# Patient Record
Sex: FEMALE | Race: WHITE | NOT HISPANIC OR LATINO | Employment: OTHER | URBAN - METROPOLITAN AREA
[De-identification: names, ages, dates, MRNs, and addresses within clinical notes are randomized per-mention and may not be internally consistent; named-entity substitution may affect disease eponyms.]

---

## 2017-01-21 ENCOUNTER — HOSPITAL ENCOUNTER (OUTPATIENT)
Dept: RADIOLOGY | Facility: HOSPITAL | Age: 67
Discharge: HOME | End: 2017-01-21
Payer: MEDICARE

## 2017-01-21 ENCOUNTER — GENERIC CONVERSION - ENCOUNTER (OUTPATIENT)
Dept: OTHER | Facility: OTHER | Age: 67
End: 2017-01-21

## 2017-01-21 ENCOUNTER — TRANSCRIBE ORDERS (OUTPATIENT)
Dept: RADIOLOGY | Facility: HOSPITAL | Age: 67
End: 2017-01-21

## 2017-01-21 ENCOUNTER — ALLSCRIPTS OFFICE VISIT (OUTPATIENT)
Dept: OTHER | Facility: OTHER | Age: 67
End: 2017-01-21

## 2017-01-21 DIAGNOSIS — J18.9 UNRESOLVED PNEUMONIA: Primary | ICD-10-CM

## 2017-01-21 DIAGNOSIS — M85.80 OTHER SPECIFIED DISORDERS OF BONE DENSITY AND STRUCTURE, UNSPECIFIED SITE: ICD-10-CM

## 2017-01-21 DIAGNOSIS — Z00.00 ENCOUNTER FOR GENERAL ADULT MEDICAL EXAMINATION WITHOUT ABNORMAL FINDINGS: ICD-10-CM

## 2017-01-21 DIAGNOSIS — R05.9 COUGH: ICD-10-CM

## 2017-01-21 DIAGNOSIS — J18.9 PNEUMONIA: ICD-10-CM

## 2017-01-21 PROCEDURE — 71020 HB CHEST X-RAY 2VW FRONTAL&LATL: CPT

## 2017-01-23 ENCOUNTER — GENERIC CONVERSION - ENCOUNTER (OUTPATIENT)
Dept: OTHER | Facility: OTHER | Age: 67
End: 2017-01-23

## 2017-02-17 ENCOUNTER — ALLSCRIPTS OFFICE VISIT (OUTPATIENT)
Dept: OTHER | Facility: OTHER | Age: 67
End: 2017-02-17

## 2017-02-17 ENCOUNTER — GENERIC CONVERSION - ENCOUNTER (OUTPATIENT)
Dept: OTHER | Facility: OTHER | Age: 67
End: 2017-02-17

## 2017-02-20 ENCOUNTER — GENERIC CONVERSION - ENCOUNTER (OUTPATIENT)
Dept: OTHER | Facility: OTHER | Age: 67
End: 2017-02-20

## 2017-02-20 ENCOUNTER — LAB CONVERSION - ENCOUNTER (OUTPATIENT)
Dept: OTHER | Facility: OTHER | Age: 67
End: 2017-02-20

## 2017-02-20 LAB
A/G RATIO (HISTORICAL): 1.9 (CALC) (ref 1–2.5)
ALBUMIN SERPL BCP-MCNC: 4.4 G/DL (ref 3.6–5.1)
ALP SERPL-CCNC: 46 U/L (ref 33–130)
ALT SERPL W P-5'-P-CCNC: 17 U/L (ref 6–29)
AST SERPL W P-5'-P-CCNC: 21 U/L (ref 10–35)
BILIRUB SERPL-MCNC: 0.6 MG/DL (ref 0.2–1.2)
BUN SERPL-MCNC: 13 MG/DL (ref 7–25)
BUN/CREA RATIO (HISTORICAL): NORMAL (CALC) (ref 6–22)
CALCIUM SERPL-MCNC: 9.2 MG/DL (ref 8.6–10.4)
CHLORIDE SERPL-SCNC: 107 MMOL/L (ref 98–110)
CHOLEST SERPL-MCNC: 261 MG/DL (ref 125–200)
CHOLEST/HDLC SERPL: 3.2 (CALC)
CO2 SERPL-SCNC: 23 MMOL/L (ref 20–31)
CREAT SERPL-MCNC: 0.85 MG/DL (ref 0.5–0.99)
EGFR AFRICAN AMERICAN (HISTORICAL): 83 ML/MIN/1.73M2
EGFR-AMERICAN CALC (HISTORICAL): 71 ML/MIN/1.73M2
GAMMA GLOBULIN (HISTORICAL): 2.3 G/DL (CALC) (ref 1.9–3.7)
GLUCOSE (HISTORICAL): 86 MG/DL (ref 65–99)
HDLC SERPL-MCNC: 81 MG/DL
HEPATITIS C ANTIBODY (HISTORICAL): NORMAL
LDL CHOLESTEROL (HISTORICAL): 166 MG/DL (CALC)
NON-HDL-CHOL (CHOL-HDL) (HISTORICAL): 180 MG/DL (CALC)
POTASSIUM SERPL-SCNC: 3.9 MMOL/L (ref 3.5–5.3)
SIGNAL TO CUT-OFF (HISTORICAL): 0.01
SODIUM SERPL-SCNC: 143 MMOL/L (ref 135–146)
TOTAL PROTEIN (HISTORICAL): 6.7 G/DL (ref 6.1–8.1)
TRIGL SERPL-MCNC: 69 MG/DL

## 2017-03-06 ENCOUNTER — HOSPITAL ENCOUNTER (OUTPATIENT)
Dept: RADIOLOGY | Age: 67
Discharge: HOME | End: 2017-03-06
Payer: MEDICARE

## 2017-03-06 DIAGNOSIS — M85.80 OTHER SPECIFIED DISORDERS OF BONE DENSITY AND STRUCTURE, UNSPECIFIED SITE: ICD-10-CM

## 2017-03-06 PROCEDURE — 77080 DXA BONE DENSITY AXIAL: CPT

## 2017-03-28 ENCOUNTER — ALLSCRIPTS OFFICE VISIT (OUTPATIENT)
Dept: OTHER | Facility: OTHER | Age: 67
End: 2017-03-28

## 2017-03-28 DIAGNOSIS — Z82.49 FAMILY HISTORY OF ISCHEMIC HEART DISEASE AND OTHER DISEASES OF THE CIRCULATORY SYSTEM: ICD-10-CM

## 2017-03-28 DIAGNOSIS — M85.80 OTHER SPECIFIED DISORDERS OF BONE DENSITY AND STRUCTURE, UNSPECIFIED SITE: ICD-10-CM

## 2017-03-31 ENCOUNTER — LAB CONVERSION - ENCOUNTER (OUTPATIENT)
Dept: OTHER | Facility: OTHER | Age: 67
End: 2017-03-31

## 2017-03-31 LAB — 25(OH)D3 SERPL-MCNC: 38 NG/ML (ref 30–100)

## 2017-04-04 ENCOUNTER — LAB CONVERSION - ENCOUNTER (OUTPATIENT)
Dept: OTHER | Facility: OTHER | Age: 67
End: 2017-04-04

## 2017-04-04 LAB
25(OH)D3 SERPL-MCNC: 38 NG/ML (ref 30–100)
APOLIPOPROTEIN B (HISTORICAL): 111 MG/DL (ref 49–103)
CHOLEST SERPL-MCNC: 261 MG/DL (ref 125–200)
CHOLEST/HDLC SERPL: 3.3 CALC
HDLC SERPL-MCNC: 80 MG/DL
HIGH SENSITIVITY C-REACTIVE PROTEIN (HISTORICAL): 0.9 MG/L
LDL CHOLESTEROL (HISTORICAL): 168 MG/DL
LDL SIZE (HISTORICAL): 228 ANGSTROM
LIPOPROTEIN (A) (HISTORICAL): 35 NMOL/L
Lab: 105 NMOL/MIN/ML (ref 50–133)
Lab: 122 NMOL/L (ref 115–386)
Lab: 1317 NMOL/L (ref 1016–2185)
Lab: 205 NMOL/L (ref 121–397)
Lab: 7540 NMOL/L (ref 5038–17886)
Lab: ABNORMAL PATTERN
NON-HDL-CHOL (CHOL-HDL) (HISTORICAL): 181 MG/DL
TRIGL SERPL-MCNC: 65 MG/DL

## 2017-04-18 ENCOUNTER — GENERIC CONVERSION - ENCOUNTER (OUTPATIENT)
Dept: OTHER | Facility: OTHER | Age: 67
End: 2017-04-18

## 2017-04-19 ENCOUNTER — HOSPITAL ENCOUNTER (OUTPATIENT)
Dept: CT IMAGING | Facility: HOSPITAL | Age: 67
Discharge: HOME | End: 2017-04-19
Attending: INTERNAL MEDICINE
Payer: MEDICARE

## 2017-04-19 ENCOUNTER — HOSPITAL ENCOUNTER (OUTPATIENT)
Dept: NON INVASIVE DIAGNOSTICS | Facility: CLINIC | Age: 67
Discharge: HOME | End: 2017-04-19
Payer: MEDICARE

## 2017-04-19 ENCOUNTER — TRANSCRIBE ORDERS (OUTPATIENT)
Dept: ADMINISTRATIVE | Facility: HOSPITAL | Age: 67
End: 2017-04-19

## 2017-04-19 DIAGNOSIS — Z82.49 FAMILY HISTORY OF ISCHEMIC HEART DISEASE AND OTHER DISEASES OF THE CIRCULATORY SYSTEM: ICD-10-CM

## 2017-04-19 LAB
CHEST PAIN STATEMENT: NORMAL
MAX DIASTOLIC BP: 60 MMHG
MAX HEART RATE: 131 BPM
MAX PREDICTED HEART RATE: 154 BPM
MAX. SYSTOLIC BP: 162 MMHG
PROTOCOL NAME: NORMAL
REASON FOR TERMINATION: NORMAL
TARGET HR FORMULA: NORMAL
TIME IN EXERCISE PHASE: 615 S

## 2017-04-19 PROCEDURE — 75571 CT HRT W/O DYE W/CA TEST: CPT

## 2017-04-19 PROCEDURE — 93306 TTE W/DOPPLER COMPLETE: CPT

## 2017-04-19 PROCEDURE — 93017 CV STRESS TEST TRACING ONLY: CPT

## 2017-04-20 ENCOUNTER — ALLSCRIPTS OFFICE VISIT (OUTPATIENT)
Dept: OTHER | Facility: OTHER | Age: 67
End: 2017-04-20

## 2017-04-20 ENCOUNTER — TRANSCRIBE ORDERS (OUTPATIENT)
Dept: ADMINISTRATIVE | Facility: HOSPITAL | Age: 67
End: 2017-04-20

## 2017-04-20 DIAGNOSIS — N64.4 MASTODYNIA: Primary | ICD-10-CM

## 2017-04-26 ENCOUNTER — HOSPITAL ENCOUNTER (OUTPATIENT)
Dept: MAMMOGRAPHY | Facility: CLINIC | Age: 67
Discharge: HOME | End: 2017-04-26
Payer: MEDICARE

## 2017-04-26 ENCOUNTER — HOSPITAL ENCOUNTER (OUTPATIENT)
Dept: ULTRASOUND IMAGING | Facility: CLINIC | Age: 67
Discharge: HOME | End: 2017-04-26
Payer: MEDICARE

## 2017-04-26 DIAGNOSIS — N64.4 MASTODYNIA: ICD-10-CM

## 2017-04-26 PROCEDURE — 76642 ULTRASOUND BREAST LIMITED: CPT

## 2017-04-26 PROCEDURE — G0204 DX MAMMO INCL CAD BI: HCPCS

## 2017-05-09 ENCOUNTER — ALLSCRIPTS OFFICE VISIT (OUTPATIENT)
Dept: OTHER | Facility: OTHER | Age: 67
End: 2017-05-09

## 2017-05-30 ENCOUNTER — GENERIC CONVERSION - ENCOUNTER (OUTPATIENT)
Dept: OTHER | Facility: OTHER | Age: 67
End: 2017-05-30

## 2017-10-06 ENCOUNTER — GENERIC CONVERSION - ENCOUNTER (OUTPATIENT)
Dept: OTHER | Facility: OTHER | Age: 67
End: 2017-10-06

## 2018-01-08 ENCOUNTER — HOSPITAL ENCOUNTER (OUTPATIENT)
Dept: RADIOLOGY | Facility: HOSPITAL | Age: 68
Discharge: HOME | End: 2018-01-08
Payer: MEDICARE

## 2018-01-08 ENCOUNTER — ALLSCRIPTS OFFICE VISIT (OUTPATIENT)
Dept: OTHER | Facility: OTHER | Age: 68
End: 2018-01-08

## 2018-01-08 ENCOUNTER — TRANSCRIBE ORDERS (OUTPATIENT)
Dept: ADMINISTRATIVE | Facility: HOSPITAL | Age: 68
End: 2018-01-08

## 2018-01-08 DIAGNOSIS — R05.9 COUGH: ICD-10-CM

## 2018-01-08 PROCEDURE — 71046 X-RAY EXAM CHEST 2 VIEWS: CPT

## 2018-01-09 NOTE — PROGRESS NOTES
Assessment   1  Persistent cough (786 2) (R05)    Plan   Persistent cough    · Promethazine-DM 6 25-15 MG/5ML Oral Syrup; TAKE 5 ML EVERY 4 TO 6 HOURS    AS NEEDED FOR COUGH   · * XR CHEST PA & LATERAL; Status:Active; Requested ANDREW:[id number]; Discussion/Summary      In summary then this is a 51-year-old female who appears to have had a case of influenza and now has a persistent cough  I auscultate no focal findings  She is in no respiratory distress and I see no evidence of cardiac disease, PE X cetera  We are going to give send her for a chest x-ray to rule out infiltrate  She is asked push fluids and use promethazine DM as needed  Discuss results of x-ray when they are available  She is asked to call in 2-3 days if her symptoms are not otherwise improving or she will call sooner or seek more urgent medical attention as needed  She agrees  Chief Complaint   I took the Tamiflu 10 days ago  I awoke at 2 am Sunday with a non stop cough  I began to vomit and did not get out of bed yesterday  I had a ST that is now better  NyQuil seems somewhat effective  I feel achy all over and I feel awful  No fever  Sputum is scant  I cant take a deep breath  No current CP but aching all over  I have been sick since [address]  History of Present Illness   HPI: The patient is a 51-year-old female states that she began with respiratory symptoms approximately 2 weeks ago  Her  was seen in the office diagnosed with influenza, started on Tamiflu and she was given a prescription for the same  She states that she took it beginning that day which was 12/26 which would have her finishing it on 12/30  She states that she canceled her plans for new year's Tara because she was not feeling well  She has continued to have a sore throat and low-grade fever with minimal cough  She states that she woke 2:00 a m  Sunday morning with a nonstop cough which ended with post cough emesis   She states that she did not get out of bed all day yesterday  She felt achy all over with nausea  She has had no documented fever  She has scant sputum  She states that she can't take a breath because it causes her to cough intractably but she has no chest pain and no true shortness of breath  She has had a mild headache  No lower GI or  symptoms  Cough, Chronic: Symptoms:  cough,-- non-productive cough,-- wheezing,-- nasal congestion,-- postnasal drainage,-- sore throat-- and-- fatigue, but-- no productive cough,-- no hemoptysis,-- no dyspnea,-- no chest pain-- and-- no fever  Review of Systems        Constitutional: chills-- and-- feeling tired, but-- no fever  Cardiovascular: No edema, PND orthopnea, but-- no lower extremity edema  Respiratory: cough, but-- as noted in HPI,-- no orthopnea-- and-- no PND  Integumentary: no rashes  Neurological: headache-- and-- Mild headache, but-- no dizziness  Active Problems   1  History of Abnormal ECG (794 31) (R94 31)   2  Breast lump (611 72) (N63 0)   3  Breast pain, right (611 71) (N64 4)   4  Breast tenderness (611 71) (N64 4)   5  History of Complete tear of right rotator cuff (727 61) (M75 121)   6  Encounter for gynecological examination with abnormal finding (V72 31) (Z01 411)   7  Hyperlipidemia (272 4) (E78 5)   8  Urge incontinence of urine (788 31) (N39 41)    Past Medical History   1  History of Abnormal ECG (794 31) (R94 31)   2  History of Arthritis (V13 4)   3  History of Atypical chest pain (786 59) (R07 89)   4  History of Complete tear of right rotator cuff (727 61) (M75 121)   5  History of  2 (V22 2) (Z33 1)   6  History of acute bronchitis (V12 69) (Z87 09)   7  History of acute sinusitis (V12 69) (Z87 09)   8  History of concussion (V15 52) (Z87 820)   9  History of osteoporosis (V13 59) (Z87 39)   10  History of Menopause (627 2) (Z78 0)   11  History of Nonvenomous Insect Bite Of Leg (916 4)   12  History of Palpitations (785 1) (R00 2)   13   History of Previously Pregnant With 2  Normal Vaginal Deliveries   14  Urge incontinence of urine (788 31) (N39 41)    Family History   Mother    1  Family history of Breast Disorders   2  Family history of    3  Family history of Diabetes Mellitus (V18 0)   4  Family history of asthma (V17 5) (Z82 5)   5  Family history of hip fracture (V17 89) (Z84 89)   6  Family history of Hiatal Hernia  Father    7  Family history of ASCVD (V17 49) (Z82 49)   8  Family history of hypertension (V17 49) (Z82 49)  Paternal Aunt    5  Family history of Breast Disorders   10  Family history of Depression With Anxiety    Social History    · Daily Coffee Consumption (2  Cups/Day)   · Denied: History of Drug Use   · Exercise: Walking   · Exercise: Yoga   · Exercising Regularly   · Has 2 children   · Never A Smoker   · No alcohol use   · Not currently sexually active   · Pets in the home    Surgical History   1  History of Arthroscopy Shoulder   2  History of Colonoscopy (Fiberoptic)   3  History of Dilation And Curettage   4  History of Knee Surgery    Current Meds    1  Betamethasone Dipropionate Aug 0 05 % External Cream; apply daily; Therapy: 26DOJ5481 to (Last Vickie Bumps)  Requested for: 2017 Ordered   2  Calcium + D TABS Recorded   3  Clobetasol Propionate 0 05 % External Ointment; APPLY AND GENTLY MASSAGE INTO     AFFECTED AREA(S) TWICE DAILY FOR TWO WEEKS AT A TIME  CALL     IF NO RESOLUTION OF ITCHING AFTER 2 WEEKS; Therapy: 2016 to (Last Vickie Bumps)  Requested for: 2017 Ordered   4  EQL Omega 3 Fish Oil CAPS Recorded   5  Ibandronate Sodium 150 MG Oral Tablet; take 1 tablet once monthly with 8 to 10 ounces     of water  stay upright and do not eat or drink for 1 hour  Requested for: 50CXC4833;     Last Rx:67Btf6507 Ordered   6  Multivitamins TABS Recorded   7  Oxybutynin Chloride ER 5 MG Oral Tablet Extended Release 24 Hour; Take 1 tablet     daily;      Therapy: 88NCT7325 to (Evaluate:25Kmm6656) Requested for: 59Mct9445; Last     Rx:[id number]; Status: ACTIVE - Renewal Denied, Transmit to Pharmacy - Awaiting     Verification Ordered   8  Vitamin D3 1000 UNIT Oral Tablet; TAKE AS DIRECTED; Therapy: (Recorded:28Mar2017) to Recorded    Allergies   1  Penicillins   2  Sulfa Drugs    Vitals    Recorded: 46FZA4503 11:55AM   Temperature 44 4 F   Systolic 613   Diastolic 58   Weight 832 lb    BMI Calculated 22 48   BSA Calculated 1 65     Physical Exam        Constitutional      General appearance: Abnormal  -- Late middle-aged female appearing fatigued but in no apparent distress  Eyes      Conjunctiva and lids: No swelling, erythema or discharge  Ears, Nose, Mouth, and Throat      Otoscopic examination: Tympanic membranes translucent with normal light reflex  Canals patent without erythema  Oropharynx: Normal with no erythema, edema, exudate or lesions  -- Mucosa is moist without lesion  Pulmonary      Respiratory effort: No increased work of breathing or signs of respiratory distress  Auscultation of lungs: Clear to auscultation  -- Lungs are presently clear to auscultation throughout  Cardiovascular      Auscultation of heart: Normal rate and rhythm, normal S1 and S2, without murmurs  -- Cardiac exam is regular without murmur gallop  Examination of extremities for edema and/or varicosities: Normal  -- No edema  Lymphatic      Palpation of lymph nodes in neck: No lymphadenopathy  -- No JVD  Musculoskeletal      Digits and nails: Normal without clubbing or cyanosis  -- Normal capillary refill without cyanosis or clubbing  Skin      Skin and subcutaneous tissue: Normal without rashes or lesions  -- Negative cord or calf tenderness  Psychiatric      Orientation to person, place, and time: Normal        Mood and affect: Normal           Future Appointments      Date/Time Provider Specialty Site   02/20/2018 08:30 AM GAYLE Hollingsworth   Family Medicine [address] [place] [address]   04/23/2018 11:20 AM GAYLE Abraham   Obstetrics/Gynecology [facility]     Signatures    Electronically signed by : GAYLE Noble ; Jan 8 2018 12:39PM EST                       (Author)

## 2018-01-09 NOTE — RESULT NOTES
Verified Results  * DXA BONE DENSITY SPINE HIP AND PELVIS [id number] 10:30AM Rosalind Godinez Order Number: [id number]    - Patient Instructions: To schedule this appointment, please contact Central Scheduling at [phone]  Test Name Result Flag Reference   DXA BONE DENSITY SPINE HIP AND PELVIS (Report)     DXA SCAN     CLINICAL HISTORY: 78-year-old woman  Menopause at age 54  OTHER RISK FACTORS: History of hip fracture in a parent  TECHNIQUE: Bone densitometry was performed using a Hologic Horizon A bone densitometer  Regions of interest appear properly placed  COMPARISON: February 19, 2015  RESULTS:      LUMBAR SPINE L1-L3: BMD 0 745 gm/cm2 / T-score -2 5 / Z score -0 7   (Lumbar levels within parentheses represent vertebrae excluded from analysis due to local structural abnormalities or artifact)  LEFT TOTAL HIP: BMD 0 772 gm/cm2 / T-score -1 4 / Z score -0 1   LEFT FEMORAL NECK: BMD 0 637 gm/cm2 / T score -1 9 / Z score -0 3     CHANGE: Since the last DXA:   LUMBAR SPINE BMD is unchanged  HIP BMD has decreased 0 012 gm/cm2 or 1 5%  This change is statistically significant  IMPRESSION:     1  Osteoporosis  2  Since a DXA study from February 19, 2015, bone mineral density has decreased 1 5% in the left hip  This change is statistically significant  3  The 10 year risk of hip fracture is 1 9% with the 10 year risk of major osteoporotic fracture being 17% as calculated by the [facility]/WHO fracture risk assessment tool (FRAX)  4  The current NOF guidelines recommend treating patients with a T-score of -2 5 or less in the lumbar spine or hips, or in post-menopausal women and men over the age of 48 with low bone mass (osteopenia) and a FRAX 10 year risk score of >3% for hip    fracture and/or >20% for major osteoporotic fracture       5  A daily intake of at least 1200 mg calcium and vitamin D 800- 1000 IU, as well as weight bearing and muscle strengthening exercise, fall prevention and avoidance of tobacco and excessive alcohol as preventive measurements are suggested  6  Follow-up DXA in two years is recommended for most patients  A one year follow-up DXA is recommended after initiation or change in therapy for osteoporosis  More frequent evaluation is also appropriate for patients with conditions associated with    rapid bone loss, such as glucocorticoid therapy  The FRAX tool has not been validated in patients currently or previously treated with pharmacotherapy for osteoporosis  In such patients, clinical judgment must be exercised in interpreting FRAX scores  It is not appropriate to use FRAX to monitor    treatment response  WHO CLASSIFICATION:   Normal (a T-score of -1 0 or higher)   Low bone mineral density (a T-score of less than -1 0 but higher than -2 5)   Osteoporosis (a T-score of -2 5 or less)   Severe osteoporosis (a T-score of -2 5 or less with a fragility fracture)     Least significant change (lumbar spine): 1 7%   Least significant change (total hip): 1 1%   Least significant change (forearm): 1 9%         Workstation performed: [id number]     Signed by:   Christiano Stanley MD   3/7/17

## 2018-01-11 NOTE — RESULT NOTES
Verified Results  (1) COMPREHENSIVE METABOLIC PANEL [id number] 73:84TO Linda Bolaños     Test Name Result Flag Reference   GLUCOSE 86 mg/dL  65-99   Fasting reference interval   UREA NITROGEN (BUN) 13 mg/dL  7-25   CREATININE 0 85 mg/dL  0 50-0 99   For patients >52years of age, the reference limit  for Creatinine is approximately 13% higher for people  identified as -American  eGFR NON-AFR  AMERICAN 71 mL/min/1 73m2  > OR = 60   eGFR AFRICAN AMERICAN 83 mL/min/1 73m2  > OR = 60   BUN/CREATININE RATIO   5-68   NOT APPLICABLE (calc)   SODIUM 143 mmol/L  135-146   POTASSIUM 3 9 mmol/L  3 5-5 3   CHLORIDE 107 mmol/L     CARBON DIOXIDE 23 mmol/L  20-31   CALCIUM 9 2 mg/dL  8 6-10 4   PROTEIN, TOTAL 6 7 g/dL  6 1-8 1   ALBUMIN 4 4 g/dL  3 6-5 1   GLOBULIN 2 3 g/dL (calc)  1 9-3 7   ALBUMIN/GLOBULIN RATIO 1 9 (calc)  1 0-2 5   BILIRUBIN, TOTAL 0 6 mg/dL  0 2-1 2   ALKALINE PHOSPHATASE 46 U/L     AST 21 U/L  10-35   ALT 17 U/L  6-29     (1) LIPID PANEL, FASTING [id number] 12:00AM Farhad Harvey     Test Name Result Flag Reference   CHOLESTEROL, TOTAL 261 mg/dL H 125-200   HDL CHOLESTEROL 81 mg/dL  > OR = 46   TRIGLICERIDES 69 mg/dL  <803   LDL-CHOLESTEROL 166 mg/dL (calc) H <130   Desirable range <100 mg/dL for patients with CHD or  diabetes and <70 mg/dL for diabetic patients with  known heart disease  CHOL/HDLC RATIO 3 2 (calc)  < OR = 5 0   NON HDL CHOLESTEROL 180 mg/dL (calc) H    Target for non-HDL cholesterol is 30 mg/dL higher than   LDL cholesterol target       (Q) CBC (H/H, RBC, INDICES, WBC, PLT) [id number] 12:00AM Farhad Harvey     Test Name Result Flag Reference   WHITE BLOOD CELL COUNT 5 3 Thousand/uL  3 8-10 8   RED BLOOD CELL COUNT 4 46 Million/uL  3 80-5 10   HEMOGLOBIN 12 9 g/dL  11 7-15 5   HEMATOCRIT 39 8 %  35 0-45 0   MCV 89 1 fL  80 0-100 0   MCH 28 9 pg  27 0-33 0   MCHC 32 4 g/dL  32 0-36 0   RDW 13 4 %  11 0-15 0   PLATELET COUNT 091 Thousand/uL  140-400   MPV 10 1 fL  7 5-12 5 WE RECEIVED YOUR HANDWRITTEN TEST ORDER AND  PERFORMED A HEMOGRAM WITH A PLATELET WITHOUT  A DIFFERENTIAL  IF THIS IS NOT WHAT YOU INTENDED  TO ORDER, PLEASE CONTACT YOUR LOCAL CLIENT SERVICE  REPRESENTATIVE IMMEDIATELY SO THAT WE CAN   ADJUST OUR BILLING APPROPRIATELY   YOU MAY ALSO   INQUIRE ABOUT ALTERNATIVE OR ADDITIONAL TESTING            (Q) HEPATITIS C ANTIBODY [id number] 12:00AM Stephen Silva     Test Name Result Flag Reference   HEPATITIS C ANTIBODY NON-REACTIVE  NON-REACTIVE   SIGNAL TO CUT-OFF 0 01  <1 00       Plan  PMH: Encounter for screening mammogram for malignant neoplasm of breast    · * MAMMO SCREENING BILATERAL W CAD; Status:Canceled;

## 2018-01-12 VITALS
SYSTOLIC BLOOD PRESSURE: 118 MMHG | BODY MASS INDEX: 21.84 KG/M2 | HEIGHT: 65 IN | DIASTOLIC BLOOD PRESSURE: 68 MMHG | HEART RATE: 60 BPM | WEIGHT: 131.06 LBS

## 2018-01-12 VITALS — DIASTOLIC BLOOD PRESSURE: 80 MMHG | TEMPERATURE: 100.4 F | SYSTOLIC BLOOD PRESSURE: 142 MMHG

## 2018-01-12 NOTE — MISCELLANEOUS
Message   Recorded as Task   Date: 06/26/2017 08:25 AM, Created By: Betsy Farfan   Task Name: Med Renewal Request   Assigned To: Marnie Richardson   Regarding Patient: Hubert Carter, Status: In Progress   Comment:    Selina Navas - 26 Jun 2017 8:25 AM     TASK CREATED  Caller: Self; Renew Medication; [phone] (Mobile Phone)  pt is requesting 2 rx's ( she thought they were refilled at yearly) for Clobetasol Propionate 0 05% ointment and Dioprolene AF cream 0 05% cream  CVs on file  pt is @ [phone]  Selina Navas - 26 Jun 2017 8:58 AM     TASK REASSIGNED: Previously Assigned To Eugenia Lopez - 26 Jun 2017 9:28 AM     TASK IN PROGRESS   Orin Cash - 26 Jun 2017 9:31 AM     TASK EDITED  Pt said pcp ordered diprolene for her - she uses clobetesol and diprolene for vag/adrian itch  She said she discussed both at last visit ( sx come and go) and you said you would refill both for her  May I refill them ? ? Thank you   Leslee Villareal - 26 Jun 2017 5:58 PM     TASK REPLIED TO: Previously Assigned To Kristen Santizo  Yes, please  Daria Delatorre - 27 Jun 2017 7:38 AM     TASK EDITED  spoke with pt    rx to ehr        Active Problems    1  History of Abnormal ECG (794 31) (R94 31)   2  Breast lump (611 72) (N63)   3  Breast lump (611 72) (N63)   4  Breast pain, right (611 71) (N64 4)   5  Breast tenderness (611 71) (N64 4)   6  History of Complete tear of right rotator cuff (727 61) (M75 121)   7  Encounter for gynecological examination with abnormal finding (V72 31) (Z01 411)   8  Hyperlipidemia (272 4) (E78 5)   9  Urge incontinence of urine (788 31) (N39 41)    Current Meds   1  Calcium + D TABS Recorded   2  EQL Omega 3 Fish Oil CAPS Recorded   3  Ibandronate Sodium 150 MG Oral Tablet; take 1 tablet once monthly with 8 to 10 ounces   of water  stay upright and do not eat or drink for 1 hour  Requested for: 50JNV4965;   Last Rx:[id number] Ordered   4  Multivitamins TABS Recorded   5  Oxybutynin Chloride ER 5 MG Oral Tablet Extended Release 24 Hour; Take 1 tablet   daily; Therapy: 75OHF6059 to (Evaluate:86Lsv5887)  Requested for: 94BUA6590; Last   Rx:[id number] Ordered   6  Vitamin D3 1000 UNIT Oral Tablet; TAKE AS DIRECTED; Therapy: (Recorded:28Mar2017) to Recorded    Allergies    1  Penicillins   2  Sulfa Drugs    Plan  PMH: Menopause, SocHx: No alcohol use    · Betamethasone Dipropionate Aug 0 05 % External Cream; apply daily  PMH: Vulvar itching    · Clobetasol Propionate 0 05 % External Ointment; APPLY AND GENTLY  MASSAGE INTO AFFECTED AREA(S) TWICE DAILY FOR TWO WEEKS AT A TIME     CALL IF NO RESOLUTION OF ITCHING AFTER 2 WEEKS    Signatures   Electronically signed by : Cira Mcfadden, ; Jun 27 2017  7:39AM EST                       (Author)

## 2018-01-12 NOTE — MISCELLANEOUS
Message  Message Free Text Note Form: stat cxr normal: pt aware        Signatures   Electronically signed by : Melania Ga; Jan 21 2017  7:57PM EST                       (Author)

## 2018-01-13 VITALS
HEIGHT: 65 IN | BODY MASS INDEX: 21.33 KG/M2 | HEART RATE: 68 BPM | WEIGHT: 128 LBS | SYSTOLIC BLOOD PRESSURE: 110 MMHG | DIASTOLIC BLOOD PRESSURE: 70 MMHG

## 2018-01-14 VITALS
SYSTOLIC BLOOD PRESSURE: 120 MMHG | WEIGHT: 130 LBS | BODY MASS INDEX: 21.66 KG/M2 | DIASTOLIC BLOOD PRESSURE: 60 MMHG | HEIGHT: 65 IN

## 2018-01-15 NOTE — RESULT NOTES
Verified Results  * XR CHEST PA & LATERAL [id number] 12:36PM Kathleen Brody     Test Name Result Flag Reference   XR CHEST PA & LATERAL (Report)     CHEST - DUAL ENERGY     INDICATION: Cough for 3 days  Possible right-sided pneumonia  COMPARISON: Chest radiographs February 13, 2013     VIEWS: PA (including soft tissue/bone algorithms) and lateral projections; 4 images     FINDINGS:        Cardiomediastinal silhouette appears unremarkable  Atherosclerotic changes in the aorta  The lungs are hyperinflated and clear  No pneumothorax or pleural effusion  Visualized osseous structures appear within normal limits for the patient's age  IMPRESSION:   Hyperinflation  No active pulmonary disease         Workstation performed: [id number]     Signed by:   Brigette Goodwin DO   1/21/17

## 2018-01-15 NOTE — RESULT NOTES
Verified Results  * MAMMO SCREENING BILATERAL W CAD [id number] 12:00AM Carlos Santizo     Test Name Result Flag Reference   MAMMO SCREENING BILATERAL W CAD 06/01/2015

## 2018-01-17 NOTE — RESULT NOTES
Verified Results  * MRI SHOULDER RIGHT WO CONTRAST [id number] 09:48AM Rebecca Donovan Order Number: [id number]   Performing Comments: MRI RIGHT SHOULDER RTC TEAR BICEP TEAR   - Patient Instructions: To schedule this appointment, please contact Central Scheduling at [phone]  Test Name Result Flag Reference   MRI SHOULDER RIGHT WO CONTRAST (Report)     MRI RIGHT SHOULDER     INDICATION: Right shoulder pain after lifting injury  Decreased range of motion  COMPARISON: Plain film dated 2/26/2016     TECHNIQUE:  The following MR sequences were obtained of the right shoulder: Localizer, axial GRE/PD fat sat, oblique coronal T2 fat sat, oblique sagittal T1/T2 fat sat  Images were acquired on a 1 5 Char unit  Gadolinium was not used  FINDINGS:     SUBCUTANEOUS TISSUES: Normal     JOINT EFFUSION: Moderate effusion  ACROMION PROCESS: Lateral downsloping of the acromion process with small subacromial spur  ROTATOR CUFF: Supraspinatus and infraspinatus insertional tendinosis with a full-thickness anterior leading edge supraspinatus tendon tear measuring up to 7 mm in transverse dimension without significant tendon retraction  Remaining muscles and tendons    of the rotator cuff appear intact without fatty muscular infiltration  SUBACROMIAL/SUBDELTOID BURSA: Minimal bursal fluid  LONG HEAD OF BICEPS TENDON: Proximal biceps tendinosis without tear  GLENOID LABRUM: Intact  GLENOHUMERAL JOINT: Intact  ACROMIOCLAVICULAR JOINT: Degenerative change noted with reactive edema  BONE MARROW SIGNAL: Normal        IMPRESSION:   1  Subacromial spur with subscapularis, supraspinatus and infraspinatus insertional tendinosis including a small full-thickness anterior leading edge supraspinatus tendon tear as above  2  Proximal biceps tendinosis without tear  3  Intact glenoid labrum  4  Joint effusion         Workstation performed: [id number]     Signed by:   Cyndie Crowley MD 3/3/16                             (1) LIPID PANEL, FASTING [id number] 12:00AM JamielaurelFarhad     Test Name Result Flag Reference   CHOLESTEROL, TOTAL 246 mg/dL H 125-200   HDL CHOLESTEROL 82 mg/dL  > OR = 46   TRIGLICERIDES 71 mg/dL  <383   LDL-CHOLESTEROL 150 mg/dL (calc) H <130   Desirable range <100 mg/dL for patients with CHD or  diabetes and <70 mg/dL for diabetic patients with  known heart disease  CHOL/HDLC RATIO 3 0 (calc)  < OR = 5 0   NON HDL CHOLESTEROL 164 mg/dL (calc) H    Target for non-HDL cholesterol is 30 mg/dL higher than   LDL cholesterol target  (1) COMPREHENSIVE METABOLIC PANEL [id number] 35:52WG [facility]     Test Name Result Flag Reference   GLUCOSE 93 mg/dL  65-99   Fasting reference interval   UREA NITROGEN (BUN) 17 mg/dL  7-25   CREATININE 0 79 mg/dL  0 50-0 99   For patients >52years of age, the reference limit  for Creatinine is approximately 13% higher for people  identified as -American  eGFR NON-AFR   AMERICAN 79 mL/min/1 73m2  > OR = 60   eGFR AFRICAN AMERICAN 91 mL/min/1 73m2  > OR = 60   BUN/CREATININE RATIO   1-32   NOT APPLICABLE (calc)   SODIUM 141 mmol/L  135-146   POTASSIUM 4 3 mmol/L  3 5-5 3   CHLORIDE 105 mmol/L     CARBON DIOXIDE 26 mmol/L  19-30   CALCIUM 10 2 mg/dL  8 6-10 4   PROTEIN, TOTAL 6 9 g/dL  6 1-8 1   ALBUMIN 4 6 g/dL  3 6-5 1   GLOBULIN 2 3 g/dL (calc)  1 9-3 7   ALBUMIN/GLOBULIN RATIO 2 0 (calc)  1 0-2 5   BILIRUBIN, TOTAL 0 4 mg/dL  0 2-1 2   ALKALINE PHOSPHATASE 43 U/L     AST 17 U/L  10-35   ALT 18 U/L  6-29     (1) T4, FREE [id number] 12:00AM Wes Farhad     Test Name Result Flag Reference   T4, FREE 1 1 ng/dL  0 8-1 8     (Q) CBC (H/H, RBC, INDICES, WBC, PLT) [id number] 12:00AM Farhad Harvey     Test Name Result Flag Reference   WHITE BLOOD CELL COUNT 6 6 Thousand/uL  3 8-10 8   RED BLOOD CELL COUNT 4 67 Million/uL  3 80-5 10   HEMOGLOBIN 13 4 g/dL  11 7-15 5   HEMATOCRIT 41 5 %  35 0-45 0   MCV 88 8 fL  80 0-100 0   MCH 28 8 pg  27 0-33 0   MCHC 32 4 g/dL  32 0-36 0   RDW 13 8 %  11 0-15 0   PLATELET COUNT 414 Thousand/uL  140-400   MPV 10 5 fL  7 5-11 5   WE RECEIVED YOUR HANDWRITTEN TEST ORDER AND  PERFORMED A HEMOGRAM WITH A PLATELET WITHOUT  A DIFFERENTIAL  IF THIS IS NOT WHAT YOU INTENDED  TO ORDER, PLEASE CONTACT YOUR LOCAL CLIENT SERVICE  REPRESENTATIVE IMMEDIATELY SO THAT WE CAN   ADJUST OUR BILLING APPROPRIATELY   YOU MAY ALSO   INQUIRE ABOUT ALTERNATIVE OR ADDITIONAL TESTING            (Q) TSH, 3RD GENERATION [id number] 12:00AM Stephen Silva     Test Name Result Flag Reference   TSH 1 17 mIU/L  0 40-4 50

## 2018-01-22 VITALS
TEMPERATURE: 98.9 F | HEIGHT: 65 IN | WEIGHT: 128 LBS | DIASTOLIC BLOOD PRESSURE: 50 MMHG | BODY MASS INDEX: 21.33 KG/M2 | SYSTOLIC BLOOD PRESSURE: 100 MMHG

## 2018-01-23 VITALS
SYSTOLIC BLOOD PRESSURE: 118 MMHG | TEMPERATURE: 98.8 F | WEIGHT: 133 LBS | DIASTOLIC BLOOD PRESSURE: 58 MMHG | BODY MASS INDEX: 22.48 KG/M2

## 2018-01-23 NOTE — RESULT NOTES
Verified Results  * XR CHEST PA & LATERAL [id number] 12:53PM Ana Rosa Montes Order Number: [id number]     Test Name Result Flag Reference   XR CHEST PA & LATERAL (Report)     CHEST      INDICATION: Cough     COMPARISON: 1/21/2017     VIEWS: Frontal and lateral projections     IMAGES: 2     FINDINGS:        Cardiomediastinal silhouette appears unremarkable  The lungs are clear  No pneumothorax or pleural effusion  Visualized osseous structures appear within normal limits for the patient's age  IMPRESSION:     No active pulmonary disease         Workstation performed: [id number]     Signed by:   Ginny Tang MD   1/8/18       Plan  PMH: Tick bite of back, initial encounter    · Doxycycline Hyclate 100 MG Oral Capsule; TAKE TWO STAT THEN AS  DIRECTED

## 2018-01-30 ENCOUNTER — ANESTHESIA EVENT (OUTPATIENT)
Dept: GASTROENTEROLOGY | Facility: HOSPITAL | Age: 68
End: 2018-01-30
Payer: MEDICARE

## 2018-01-30 NOTE — ANESTHESIA PREPROCEDURE EVALUATION
Review of Systems/Medical History  Patient summary reviewed        Cardiovascular  Negative cardio ROS    Pulmonary  Negative pulmonary ROS        GI/Hepatic    GERD ,        Negative  ROS        Endo/Other  Negative endo/other ROS      GYN  Negative gynecology ROS          Hematology  Negative hematology ROS      Musculoskeletal  Negative musculoskeletal ROS        Neurology  Negative neurology ROS      Psychology   Negative psychology ROS              Physical Exam    Airway    Mallampati score: II  TM Distance: >3 FB  Neck ROM: full     Dental       Cardiovascular  Comment: Negative ROS, Cardiovascular exam normal    Pulmonary  Pulmonary exam normal     Other Findings        Anesthesia Plan  ASA Score- 2     Anesthesia Type- IV sedation with anesthesia with ASA Monitors  Additional Monitors:   Airway Plan:         Plan Factors-    Induction- intravenous  Postoperative Plan-     Informed Consent- Anesthetic plan and risks discussed with patient  I personally reviewed this patient with the CRNA  Discussed and agreed on the Anesthesia Plan with the CRNA  Love Valdes

## 2018-01-31 ENCOUNTER — ANESTHESIA (OUTPATIENT)
Dept: GASTROENTEROLOGY | Facility: HOSPITAL | Age: 68
End: 2018-01-31
Payer: MEDICARE

## 2018-01-31 ENCOUNTER — HOSPITAL ENCOUNTER (OUTPATIENT)
Facility: HOSPITAL | Age: 68
Setting detail: OUTPATIENT SURGERY
Discharge: HOME | End: 2018-01-31
Attending: INTERNAL MEDICINE | Admitting: INTERNAL MEDICINE
Payer: MEDICARE

## 2018-01-31 VITALS
HEART RATE: 69 BPM | HEIGHT: 64 IN | RESPIRATION RATE: 16 BRPM | BODY MASS INDEX: 22.2 KG/M2 | OXYGEN SATURATION: 97 % | DIASTOLIC BLOOD PRESSURE: 62 MMHG | WEIGHT: 130 LBS | TEMPERATURE: 99.1 F | SYSTOLIC BLOOD PRESSURE: 105 MMHG

## 2018-01-31 RX ORDER — TOBRAMYCIN 3 MG/ML
2 SOLUTION/ DROPS OPHTHALMIC
Status: DISCONTINUED | OUTPATIENT
Start: 2018-01-31 | End: 2018-01-31 | Stop reason: HOSPADM

## 2018-01-31 RX ORDER — PROPOFOL 10 MG/ML
INJECTION, EMULSION INTRAVENOUS AS NEEDED
Status: DISCONTINUED | OUTPATIENT
Start: 2018-01-31 | End: 2018-01-31 | Stop reason: SURG

## 2018-01-31 RX ORDER — SODIUM CHLORIDE 9 MG/ML
125 INJECTION, SOLUTION INTRAVENOUS CONTINUOUS
Status: DISCONTINUED | OUTPATIENT
Start: 2018-01-31 | End: 2018-01-31 | Stop reason: HOSPADM

## 2018-01-31 RX ORDER — PROPOFOL 10 MG/ML
INJECTION, EMULSION INTRAVENOUS CONTINUOUS PRN
Status: DISCONTINUED | OUTPATIENT
Start: 2018-01-31 | End: 2018-01-31 | Stop reason: SURG

## 2018-01-31 RX ORDER — OXYBUTYNIN CHLORIDE 5 MG/1
5 TABLET, EXTENDED RELEASE ORAL DAILY
COMMUNITY
End: 2018-02-20 | Stop reason: SDUPTHER

## 2018-01-31 RX ADMIN — PROPOFOL 100 MG: 10 INJECTION, EMULSION INTRAVENOUS at 10:25

## 2018-01-31 RX ADMIN — SODIUM CHLORIDE: 0.9 INJECTION, SOLUTION INTRAVENOUS at 09:38

## 2018-01-31 RX ADMIN — PROPOFOL 100 MCG/KG/MIN: 10 INJECTION, EMULSION INTRAVENOUS at 10:25

## 2018-01-31 NOTE — ANESTHESIA POSTPROCEDURE EVALUATION
Post-Op Assessment Note      CV Status:  Stable    Mental Status:  Somnolent    Hydration Status:  Euvolemic    PONV Controlled:  Controlled    Airway Patency:  Patent    Post Op Vitals Reviewed: Yes          Staff: CRNA           /59 (01/31/18 1050)    Temp      Pulse 80 (01/31/18 1050)   Resp 16 (01/31/18 1050)    SpO2 99 % (01/31/18 1050)

## 2018-01-31 NOTE — H&P
H&P EXAM - Outpatient Endoscopy   Jesus Gillespie 79 y o  female MRN: [id number]    [facility] GI LAB PRE/POST   Encounter: [id number]        Impression:  Average risk colonoscopy screening    Plan:  Colonoscopy    Chief Complaint:  Screening    Physical Exam:  Alert oriented in no distress   Chest:  Clear   Heart:  Regular    No new problems prep went    Well

## 2018-01-31 NOTE — OP NOTE
**** GI/ENDOSCOPY REPORT ****     PATIENT NAME: Parris Ni ------ VISIT ID:  Patient ID: [id number]   YOB: 1950     INTRODUCTION: Colonoscopy - A 79 female patient presents for an outpatient   Colonoscopy at [address] [place] [address]  PREVIOUS COLONOSCOPY: Patient's last colonoscopy was 10 years ago  INDICATIONS: Surveillance  Average-risk screening for colon cancer and   colonic polyps  CONSENT:  The benefits, risks, and alternatives to the procedure were   discussed and informed consent was obtained from the patient  PREPARATION: EKG, pulse, pulse oximetry and blood pressure were monitored   throughout the procedure  The patient was identified by myself both   verbally and by visual inspection of ID band  Airway Assessment   Classification: Airway class 2 - Visualization of the soft palate, fauces   and uvula  ASA Classification: Class 2 - Patient has mild to moderate   systemic disturbance that may or may not be related to the disorder   requiring surgery  MEDICATIONS: Anesthesia-check records     PROCEDURE:  The endoscope was passed without difficulty through the anus   under direct visualization and advanced to the cecum, confirmed by   appendiceal orifice and ileocecal valve  The scope was withdrawn and the   mucosa was carefully examined  The quality of the preparation was  Cecal   Intubation Time: Minute(s) Scope Withdrawal Time: Minute(s)     RECTAL EXAM: Normal rectal exam  Normal sphincter tone  FINDINGS:  The colon appeared to be normal      COMPLICATIONS: There were no complications  IMPRESSIONS: Normal colon  RECOMMENDATIONS: Colonoscopy recommended in 10 years  patient will be sent   a reminder letter       ESTIMATED BLOOD LOSS:     PATHOLOGY SPECIMENS:     PROCEDURE CODES:     ICD-9 Codes: V76 51 Special screening for malignant neoplasms of colon     ICD-10 Codes: Z12 11 Encounter for screening for malignant neoplasm of   colon PERFORMED BY: GAYLE Hunt Aas  on 01/31/2018  Version 1, electronically signed by GAYLE Villasenor , D O  on   01/31/2018 at 10:48

## 2018-01-31 NOTE — ANESTHESIA POSTPROCEDURE EVALUATION
Post-Op Assessment Note          Comments: Pt c/o "stinging in the left eye" no FB senseation  No tearing  Sclera is red  Explained that I will give her tobramycin drops  After calling in a STAt order to pharmacy  Pt now states the eye is no longer stinging and is getting better  Will give patient eye drop if she wishes to use them  All agree with this plan            /62 (01/31/18 1113)    Temp      Pulse 69 (01/31/18 1113)   Resp 16 (01/31/18 1113)    SpO2 97 % (01/31/18 1113)

## 2018-02-20 ENCOUNTER — OFFICE VISIT (OUTPATIENT)
Dept: FAMILY MEDICINE CLINIC | Facility: CLINIC | Age: 68
End: 2018-02-20
Payer: MEDICARE

## 2018-02-20 VITALS
SYSTOLIC BLOOD PRESSURE: 110 MMHG | WEIGHT: 131 LBS | DIASTOLIC BLOOD PRESSURE: 60 MMHG | HEIGHT: 65 IN | BODY MASS INDEX: 21.83 KG/M2

## 2018-02-20 DIAGNOSIS — Z00.00 HEALTH MAINTENANCE EXAMINATION: Primary | ICD-10-CM

## 2018-02-20 DIAGNOSIS — R92.2 DENSE BREAST TISSUE ON MAMMOGRAM: ICD-10-CM

## 2018-02-20 DIAGNOSIS — J98.01 BRONCHOSPASM: ICD-10-CM

## 2018-02-20 DIAGNOSIS — E78.5 HYPERLIPIDEMIA, UNSPECIFIED HYPERLIPIDEMIA TYPE: ICD-10-CM

## 2018-02-20 DIAGNOSIS — Z12.39 SCREENING BREAST EXAMINATION: ICD-10-CM

## 2018-02-20 DIAGNOSIS — N39.41 URGE INCONTINENCE OF URINE: ICD-10-CM

## 2018-02-20 PROBLEM — N63.0 BREAST LUMP: Status: ACTIVE | Noted: 2017-04-20

## 2018-02-20 PROCEDURE — 36415 COLL VENOUS BLD VENIPUNCTURE: CPT | Performed by: FAMILY MEDICINE

## 2018-02-20 PROCEDURE — G0439 PPPS, SUBSEQ VISIT: HCPCS | Performed by: FAMILY MEDICINE

## 2018-02-20 RX ORDER — METHYLPREDNISOLONE 4 MG/1
TABLET ORAL
Qty: 21 TABLET | Refills: 0 | Status: SHIPPED | OUTPATIENT
Start: 2018-02-20 | End: 2018-04-24 | Stop reason: ALTCHOICE

## 2018-02-20 RX ORDER — OXYBUTYNIN CHLORIDE 5 MG/1
5 TABLET, EXTENDED RELEASE ORAL DAILY
Qty: 30 TABLET | Refills: 5 | Status: SHIPPED | OUTPATIENT
Start: 2018-02-20 | End: 2018-02-20 | Stop reason: SDUPTHER

## 2018-02-20 RX ORDER — OXYBUTYNIN CHLORIDE 5 MG/1
5 TABLET, EXTENDED RELEASE ORAL DAILY
Qty: 90 TABLET | Refills: 1 | Status: SHIPPED | OUTPATIENT
Start: 2018-02-20

## 2018-02-20 NOTE — PROGRESS NOTES
HPI:  Matthew Stokes is a 79 y o  female here for her Subsequent Wellness Visit  The patient is a 80-year-old female here for a Medicare subsequent wellness visit  All components of this examination were completed today  We did discuss some other issues  She has been on Boniva for least 5 years  We are going to have her discontinue this prescription after her current supply is exhausted in the next 2-3 months  She did have mammographic abnormality noted last year  It was suggested that she return to 1 year follow-up after her findings were felt to be benign  It was noted that she had dense breasts summer going to recommend that she get 3D mammography this year  She is given a requisition for same  She has of persistent cough that she complains of today which is bronchospastic in nature  Her  notes that she wheezes at night  She has had no significant sputum, chest pain or shortness of breath  We reviewed her vaccinations which are current  She did have a Zostavax at [facility] within the last couple years and will need to follow up on the date of this  Her current cell phone number is [phone]  Patient Active Problem List   Diagnosis    Complete tear of right rotator cuff    Hyperlipidemia    Breast lump    Urge incontinence of urine    Bronchospasm    Dense breast tissue on mammogram    Screening breast examination     Past Medical History:   Diagnosis Date    Acid reflux     with certain foods    Blepharitis of right upper eyelid     sty lanced 4/10/16    Osteopenia      Past Surgical History:   Procedure Laterality Date    ARTHROSCOPIC REPAIR ACL Left     COLONOSCOPY      DILATION AND CURETTAGE, DIAGNOSTIC / THERAPEUTIC      VT COLONOSCOPY FLX DX W/COLLJ SPEC WHEN PFRMD N/A 1/31/2018    Procedure: COLONOSCOPY;  Surgeon: Myriam Hernandez MD;  Location: [facility];   Service: Gastroenterology    VT [address] ARTHROSCOP,SURG,W/ROTAT CUFF REPR Right 5/5/2016    Procedure: REPAIR ROTATOR CUFF  ARTHROSCOPIC;  Surgeon: Johnny Mccallum MD;  Location: [facility] MAIN OR;  Service: Orthopedics    SHOULDER ARTHROSCOPY Right 5/5/2016    Procedure: ARTHROSCOPY SHOULDER-subacromial decompression, Biceps tenodesis; Surgeon: Johnny Mccallum MD;  Location: [facility] MAIN OR;  Service:     TONSILLECTOMY      age 10    WISDOM TOOTH EXTRACTION      x 4     Family History   Problem Relation Age of Onset    Cancer Mother      breast    Asthma Mother     Diverticulitis Mother     Heart disease Father      pacemaker, MI    Arthritis Father     Cancer Paternal Aunt      breast    Heart disease Paternal Aunt     Stroke Paternal Aunt     Heart disease Paternal Uncle     Heart disease Paternal Grandmother      History   Smoking Status    Never Smoker   Smokeless Tobacco    Never Used     History   Alcohol Use    Yes     Comment: occ      History   Drug Use No     /60 (BP Location: Right arm, Patient Position: Sitting, Cuff Size: Standard)   Ht 5' 4 5" (1 638 m)   Wt 59 4 kg (131 lb)   LMP  (LMP Unknown) Comment: POST MENOPAUSAL  BMI 22 14 kg/m²       Current Outpatient Prescriptions   Medication Sig Dispense Refill    Calcium Carbonate-Vitamin D (CALTRATE 600+D PO) Take by mouth every morning   ibandronate (BONIVA) 150 MG tablet Take 150 mg by mouth every 30 (thirty) days  Take in AM with glass of water prior to food, don't lie down for 30 minutes   Omega 3-6-9 Fatty Acids (OMEGA 3-6-9 PO) Take by mouth 2 (two) times a day   oxybutynin (DITROPAN-XL) 5 mg 24 hr tablet Take 1 tablet (5 mg total) by mouth daily 90 tablet 1    Vitamin D, Cholecalciferol, 1000 UNITS CAPS Take by mouth every morning   ibuprofen (MOTRIN) 600 mg tablet Take 1 tablet by mouth every 6 (six) hours as needed for mild pain for up to 10 days 40 tablet 0    Methylprednisolone 4 MG TBPK Use as directed on package 21 tablet 0     No current facility-administered medications for this visit  Allergies   Allergen Reactions    Penicillins Anaphylaxis     Hives, throat swelling    Sulfa Antibiotics Anaphylaxis     Hives, throat swelling     Immunization History   Administered Date(s) Administered    Influenza 02/10/2014    Influenza Split High Dose Preservative Free IM 10/08/2015    Influenza TIV (IM) 09/17/2016    Pneumococcal Conjugate 13-Valent 02/16/2016       Patient Care Team:  Roney Booth MD as PCP - MD Roney Peoples MD    Medicare Screening Tests and Risk Assessments:  AW Clinical     ISAR:   Previous hospitalizations?:  No       Once in a Lifetime Medicare Screening:       Medicare Screening Tests and Risk Assessment:   AAA Risk Assessment    Osteoporosis Risk Assessment     Female:  Yes   :  Yes    Age over 48:  Yes    Tobacco use:  No Alcohol use:  Yes   HIV Risk Assessment        Drug and Alcohol Use:   Tobacco use    Cigarettes:  never smoker    Smokeless:  never used smokeless tobacco    Tobacco use duration    Tobacco Cessation Readiness    Alcohol use    Alcohol use:  occasional use    Amount of alcohol consumed:  1 glass of wine occasionallly   Concern about alcohol use:  No    Alcohol Treatment Readiness   Illicit Drug Use    Drug use:  never        Diet & Exercise:   Diet   What is your diet?:  Regular, Low Cholesterol, Low Carb   How many servings a day of the following:   Fruits and Vegetables:  1-2 Meat:  1-2   Whole Grains:  2 Simple Carbs:  2   Dairy:  2 Soda:  0   Coffee:  2 Tea:  1   Exercise    Do you currently exercise?:  yes    Frequency:  daily    Minutes per day:  60    Type of exercise:  walking   stretching        Cognitive Impairment Screening:   Depression screening preformed:  Yes    Cognitive Impairment Screening    Do you have difficulty learning or retaining new information?:  No Do you have difficulty handling new tasks?:  No   Do you have difficulty with reasoning?:  No Do you have difficulty with spatial ability and orientation?:  No   Do you have difficulty with language?:  No Do you have difficulty with behavior?:  No       Functional Ability/Level of Safety:   Hearing     Bilateral:  slightly decreased   Hearing aid:  No    Hearing Impairment Assessment    Current Activities    Status:  unlimited ADL's, unlimited social activities   Help needed with the folllowing:    Using the phone:  No Transportation:  No   Shopping:  No Preparing Meals:  No   Doing Housework:  No Doing Laundry:  No   Managing Medications:  No Managing Money:  No   ADL    Feeding:  Independant   Oral hygiene and Facial grooming:  Independant   Bathing:  Independant   Upper Body Dressing:  Independant   Lower Body Dressing:  Independant   Toileting:  Independant   Bed Mobility:  Independant   Fall Risk   Have you fallen in the last 12 months?:  No    Injury History       Home Safety:   Home Safety Risk Factors   Unfamilar with surroundings:  No Uneven floors:  No   Stairs or handrail saftey risk:  Yes Loose rugs:  Yes   Household clutter:  No Poor household lighting:  No   No grab bars in bathroom:  No Further evaluation needed:  No       Advanced Directives:   Advanced Directives    Living Will:  Yes Durable POA for healthcare:   Yes   Advanced directive:  Yes    Patient's End of Life Decisions        Urinary Incontinence:       Glaucoma:            Provider Screening     Preventative Screening/Counseling:   Cardiovascular Screening/Counseling:   (Labs Q5 years, EKG optional one-time)   General:  Risks and Benefits Discussed, Screening Current Counseling:  Healthy Diet, Healthy Weight Due for: Lab Panel/Analytes:  Lipid Panel         Diabetes Screening/Counseling:   (2 tests/year if Pre-Diabetes or 1 test/year if no Diabetes)   General:  Risks and Benefits Discussed, Screening Current  Due for: Labs:  Blood Glucose         Colorectal Cancer Screening/Counseling:   (FOBT Q1 yr; Flex Sig Q4 yrs or Q10 yrs after Screening Colonoscopy; Screening Colonoscpy Q2 yrs High Risk or Q10 yrs Low Risk; Barium Enema Q2 yrs High Risk or Q4 yrs Low Risk)   General:  Screening Current Counseling:  high fiber diet          Prostate Cancer Screening/Counseling:   (Annual)          Breast Cancer Screening/Counseling:   (Baseline Age 28 - 43; Annual Age 36+)   General:  Risks and Benefits Discussed Due for: Studies:  mammogram         Cervical Cancer Screening/Counseling:   (Annual for High Risk or Childbearing Age with Abnormal Pap in Last 3 yrs; Every 2 all others)   General:  Screening Current           Osteoporosis Screening/Counseling:   (Every 2 Yrs if at risk or more if medically necessary)   General:  Screening Current Counseling:  Calcium and Vitamin D Intake, Regular Weightbearing Exercise          AAA Screening/Counseling:   (Once per Lifetime with risk factors)    General:  Screening Not Indicated           Glaucoma Screening/Counseling:   (Annual)   General:  Screening Current Due for: Referrals:  referral to optometry         HIV Screening/Counseling:   (Voluntary; Once annually for high risk OR 3 times for Pregnancy at diagnosis of IUP; 3rd trimester; and at Labor   General:  Screening Not Indicated           Hepatitis C Screening:             Immunizations:   Influenza (annual): Influenza UTD This Year   Pneumococcal (Once in a Lifetime):  Lifetime Vaccine Completed   Hepatitis B Series (low risk patients):  Series Not Indicated   Zostavax (Medicare D Coverage, Pt >66 yo):  Zostavax Vaccine UTD       Other Preventative Couseling (Non-Medicare Wellness Visit Required):   nutrition counseling performed, car/seat belt/driving safety reviewed, alcohol use counseling provided, Increased physical activity counseling given       Referrals (Non-Medicare Wellness Visit Required):       Medical Equipment/Suppliers:   None           No exam data present    Physical Exam :  Physical Exam   Constitutional: She is oriented to person, place, and time  She appears well-nourished  HENT:   Head: Normocephalic and atraumatic  Mouth/Throat: Oropharynx is clear and moist  No oropharyngeal exudate  Eyes: Pupils are equal, round, and reactive to light  No scleral icterus  Neck: Normal range of motion  Neck supple  No thyromegaly present  Cardiovascular: Normal rate, regular rhythm and normal heart sounds  Exam reveals no gallop  No murmur heard  Pulmonary/Chest: Effort normal  She has wheezes  She does have some mild forced expiratory wheeze  I hear no crackle or rhonchi present  Abdominal: Soft  Bowel sounds are normal  She exhibits no mass  There is no tenderness  Musculoskeletal: She exhibits no edema  Lymphadenopathy:     She has no cervical adenopathy  Neurological: She is alert and oriented to person, place, and time  Skin: No rash noted  Psychiatric: She has a normal mood and affect  Reviewed Updated [facility] Prior Wellness Visits:   Last Medicare wellness visit information was reviewed, patient interviewed , no change since last AWVyes  Last Medicare wellness visit information was reviewed, patient interviewed and updates made to the record today yes    Assessment and Plan:  1  Health maintenance examination     2  Bronchospasm  Methylprednisolone 4 MG TBPK   3  Urge incontinence of urine  oxybutynin (DITROPAN-XL) 5 mg 24 hr tablet    DISCONTINUED: oxybutynin (DITROPAN-XL) 5 mg 24 hr tablet   4  Dense breast tissue on mammogram  Mammo screening bilateral w 3d & cad   5  Screening breast examination  Mammo screening bilateral w 3d & cad   6   Hyperlipidemia, unspecified hyperlipidemia type  CBC    Comprehensive metabolic panel    Lipid panel    TSH, 3rd generation with T4 reflex       Health Maintenance Due   Topic Date Due    DTaP,Tdap,and Td Vaccines (1 - Tdap) 09/22/1957    Depression Screening PHQ-9  09/22/1962    Fall Risk  09/22/2015    Urinary Incontinence Screening  09/22/2015    PNEUMOCOCCAL POLYSACCHARIDE VACCINE AGE 65 AND OVER  09/22/2015  INFLUENZA VACCINE  09/01/2017    GLAUCOMA SCREENING 67+ YR  09/22/2017

## 2018-02-21 NOTE — ASSESSMENT & PLAN NOTE
She has hyperlipidemia with a good ratio  We are going to get lipids today and also CBC CMP and TSH

## 2018-02-21 NOTE — PATIENT INSTRUCTIONS
Patient is going to go for her 3D mammography  Follow up when results are available  Vaccinations are current  ECG is current and she in fact had evaluation by Cardiology last year  DEXA scan is current  She does have low bone mineral density but has had over 5 years of bisphosphonate therapy and we are going to discontinue this after her current supply of Vitaliy Inch is exhausted  She will get her DEXA scan as scheduled  For colonoscopy is current  She is going to continue Detrol for her urinary stress incontinence we refilled this today  We are going to have fasting blood work performed  She has a bronchospastic cough today for which we gave her a Medrol Dosepak  She is going to call back she develops any sputum, chest pain, shortness of breath or other progressive or on resolving symptoms to which she agrees

## 2018-02-22 LAB
ALBUMIN SERPL-MCNC: 4.2 G/DL (ref 3.6–5.1)
ALBUMIN/GLOB SERPL: 1.7 (CALC) (ref 1–2.5)
ALP SERPL-CCNC: 46 U/L (ref 33–130)
ALT SERPL-CCNC: 20 U/L (ref 6–29)
AST SERPL-CCNC: 17 U/L (ref 10–35)
BASOPHILS # BLD AUTO: 68 CELLS/UL (ref 0–200)
BASOPHILS NFR BLD AUTO: 0.9 %
BILIRUB SERPL-MCNC: 0.4 MG/DL (ref 0.2–1.2)
BUN SERPL-MCNC: 16 MG/DL (ref 7–25)
BUN/CREAT SERPL: NORMAL (CALC) (ref 6–22)
CALCIUM SERPL-MCNC: 9.7 MG/DL (ref 8.6–10.4)
CHLORIDE SERPL-SCNC: 106 MMOL/L (ref 98–110)
CHOLEST SERPL-MCNC: 276 MG/DL
CHOLEST/HDLC SERPL: 3.5 (CALC)
CO2 SERPL-SCNC: 29 MMOL/L (ref 20–31)
CREAT SERPL-MCNC: 0.81 MG/DL (ref 0.5–0.99)
EOSINOPHIL # BLD AUTO: 143 CELLS/UL (ref 15–500)
EOSINOPHIL NFR BLD AUTO: 1.9 %
ERYTHROCYTE [DISTWIDTH] IN BLOOD BY AUTOMATED COUNT: 12.7 % (ref 11–15)
GLOBULIN SER CALC-MCNC: 2.5 G/DL (CALC) (ref 1.9–3.7)
GLUCOSE SERPL-MCNC: 98 MG/DL (ref 65–99)
HCT VFR BLD AUTO: 40.8 % (ref 35–45)
HDLC SERPL-MCNC: 78 MG/DL
HGB BLD-MCNC: 13.2 G/DL (ref 11.7–15.5)
LDLC SERPL CALC-MCNC: 180 MG/DL (CALC)
LYMPHOCYTES # BLD AUTO: 1785 CELLS/UL (ref 850–3900)
LYMPHOCYTES NFR BLD AUTO: 23.8 %
MCH RBC QN AUTO: 29.1 PG (ref 27–33)
MCHC RBC AUTO-ENTMCNC: 32.4 G/DL (ref 32–36)
MCV RBC AUTO: 89.9 FL (ref 80–100)
MONOCYTES # BLD AUTO: 428 CELLS/UL (ref 200–950)
MONOCYTES NFR BLD AUTO: 5.7 %
NEUTROPHILS # BLD AUTO: 5078 CELLS/UL (ref 1500–7800)
NEUTROPHILS NFR BLD AUTO: 67.7 %
NONHDLC SERPL-MCNC: 198 MG/DL (CALC)
PLATELET # BLD AUTO: 277 THOUSAND/UL (ref 140–400)
PMV BLD REES-ECKER: 11.6 FL (ref 7.5–12.5)
POTASSIUM SERPL-SCNC: 4.3 MMOL/L (ref 3.5–5.3)
PROT SERPL-MCNC: 6.7 G/DL (ref 6.1–8.1)
RBC # BLD AUTO: 4.54 MILLION/UL (ref 3.8–5.1)
SL AMB EGFR AFRICAN AMERICAN: 87 ML/MIN/1.73M2
SL AMB EGFR NON AFRICAN AMERICAN: 75 ML/MIN/1.73M2
SODIUM SERPL-SCNC: 141 MMOL/L (ref 135–146)
TRIGL SERPL-MCNC: 78 MG/DL
TSH SERPL-ACNC: 1.28 MIU/L (ref 0.4–4.5)
WBC # BLD AUTO: 7.5 THOUSAND/UL (ref 3.8–10.8)

## 2018-04-24 ENCOUNTER — ANNUAL EXAM (OUTPATIENT)
Dept: OBGYN CLINIC | Facility: CLINIC | Age: 68
End: 2018-04-24
Payer: MEDICARE

## 2018-04-24 VITALS
DIASTOLIC BLOOD PRESSURE: 60 MMHG | SYSTOLIC BLOOD PRESSURE: 126 MMHG | BODY MASS INDEX: 21.85 KG/M2 | HEIGHT: 64 IN | WEIGHT: 128 LBS

## 2018-04-24 DIAGNOSIS — Z01.419 ENCOUNTER FOR GYNECOLOGICAL EXAMINATION WITHOUT ABNORMAL FINDING: Primary | ICD-10-CM

## 2018-04-24 DIAGNOSIS — N90.89 VULVAR LESION: ICD-10-CM

## 2018-04-24 PROCEDURE — G0101 CA SCREEN;PELVIC/BREAST EXAM: HCPCS | Performed by: OBSTETRICS & GYNECOLOGY

## 2018-04-24 RX ORDER — CLOBETASOL PROPIONATE 0.5 MG/G
OINTMENT TOPICAL 2 TIMES DAILY PRN
Qty: 30 G | Refills: 3 | Status: SHIPPED | OUTPATIENT
Start: 2018-04-24

## 2018-04-24 RX ORDER — CYCLOSPORINE 0.5 MG/ML
EMULSION OPHTHALMIC
Refills: 3 | COMMUNITY
Start: 2018-04-14

## 2018-04-24 NOTE — PROGRESS NOTES
Nelia Plain   1950    CC:  Yearly exam    S:  79 y o  female here for yearly exam  She is postmenopausal and has had no vaginal bleeding  She denies vaginal discharge, itching, odor or dryness  She is not sexually active  She and her  are happy with this  She occasionally uses Clobetasol for a vaginal irritation but reports good relief after a few days use and stays away for months  She is moving to a [address] and older community near [place] and leasing an apartment in [facility]  She will continue her care here for now  Last Pap 4/3/14 normal/negative HPV  Last Mammo 4/26/2017 BIRAD-2  Last DEXA 3/6/217 osteoporosis  Last Colonoscopy 2018 - 10 years      Current Outpatient Prescriptions:     Calcium Carbonate-Vitamin D (CALTRATE 600+D PO), Take by mouth every morning , Disp: , Rfl:     Omega 3-6-9 Fatty Acids (OMEGA 3-6-9 PO), Take by mouth 2 (two) times a day , Disp: , Rfl:     oxybutynin (DITROPAN-XL) 5 mg 24 hr tablet, Take 1 tablet (5 mg total) by mouth daily, Disp: 90 tablet, Rfl: 1    RESTASIS 0 05 % ophthalmic emulsion, PLACE 1 DROP INTO BOTH EYES TWICE A DAY, Disp: , Rfl: 3    ibuprofen (MOTRIN) 600 mg tablet, Take 1 tablet by mouth every 6 (six) hours as needed for mild pain for up to 10 days, Disp: 40 tablet, Rfl: 0  Social History     Social History    Marital status: /Civil Union     Spouse name: N/A    Number of children: N/A    Years of education: N/A     Occupational History    Not on file       Social History Main Topics    Smoking status: Never Smoker    Smokeless tobacco: Never Used    Alcohol use 0 6 oz/week     1 Glasses of wine per week      Comment: occasional    Drug use: No    Sexual activity: Not Currently     Partners: Male     Birth control/ protection: Post-menopausal      Comment:  55 years     Other Topics Concern    Not on file     Social History Narrative    No narrative on file     Family History   Problem Relation Age of Onset    Cancer Mother      breast    Asthma Mother     Diverticulitis Mother     Breast cancer Mother     Heart disease Father      pacemaker, MI   Elwyn Serge Arthritis Father     Cancer Paternal Aunt      breast    Heart disease Paternal Aunt     Stroke Paternal Aunt     Heart disease Paternal Uncle     Heart disease Paternal Grandmother     Thyroid disease Sister     Bipolar disorder Sister      Past Medical History:   Diagnosis Date    Acid reflux     with certain foods    Blepharitis of right upper eyelid     sty lanced 4/10/16    Osteopenia         O:  Blood pressure 126/60, height 5' 4" (1 626 m), weight 58 1 kg (128 lb)  Patient appears well and is not in distress  Neck is supple without masses  Breasts are symmetrical without mass, tenderness, nipple discharge, skin changes or adenopathy  Abdomen is soft and nontender without masses  External genitals are normal without lesions or rashes  Vagina is normal without discharge or bleeding  Cervix is normal without discharge or lesion  Uterus is normal, mobile, nontender without palpable mass  Adnexa are normal, nontender, without palpable mass  A:  Yearly exam      P:  RTO one year for yearly exam or sooner as needed  -Rx sent for clobetasol

## 2018-04-26 ENCOUNTER — TRANSCRIBE ORDERS (OUTPATIENT)
Dept: RADIOLOGY | Facility: CLINIC | Age: 68
End: 2018-04-26

## 2018-04-30 ENCOUNTER — HOSPITAL ENCOUNTER (OUTPATIENT)
Dept: RADIOLOGY | Age: 68
Discharge: HOME | End: 2018-04-30
Payer: MEDICARE

## 2018-04-30 PROCEDURE — 77067 SCR MAMMO BI INCL CAD: CPT

## 2018-04-30 PROCEDURE — 77063 BREAST TOMOSYNTHESIS BI: CPT
